# Patient Record
Sex: MALE | Race: WHITE
[De-identification: names, ages, dates, MRNs, and addresses within clinical notes are randomized per-mention and may not be internally consistent; named-entity substitution may affect disease eponyms.]

---

## 2017-07-20 ENCOUNTER — HOSPITAL ENCOUNTER (OUTPATIENT)
Dept: HOSPITAL 23 - CED | Age: 32
Setting detail: OBSERVATION
LOS: 1 days | Discharge: HOME | DRG: 395 | End: 2017-07-21
Attending: INTERNAL MEDICINE | Admitting: INTERNAL MEDICINE
Payer: COMMERCIAL

## 2017-07-20 VITALS — HEIGHT: 72 IN | WEIGHT: 240 LBS | BODY MASS INDEX: 32.51 KG/M2

## 2017-07-20 DIAGNOSIS — Z79.899: ICD-10-CM

## 2017-07-20 DIAGNOSIS — K22.2: ICD-10-CM

## 2017-07-20 DIAGNOSIS — K20.9: ICD-10-CM

## 2017-07-20 DIAGNOSIS — T18.128A: Primary | ICD-10-CM

## 2017-07-20 LAB
BASOPHIL#: 0 X10E3
BASOPHIL%: 0.3 %
BLOOD UREA NITROGEN: 10 MG/DL
BUN/CREATININE RATIO: 7.69
CALCIUM SERUM: 9.2 MG/DL
CK MB SERPL-RTO: 14.7 %
CK MB SERPL-RTO: 31.7 G/DL
CREATININE SERUM: 1.3 MG/DL
DIFF IND: NO
EOSINOPHIL#: 0.3 X10E3
EOSINOPHIL%: 2.3 %
GLOM FILT RATE ESTIMATED: 71.7 ML/MIN
GLUCOSE FASTING: 82 MG/DL
HEMATOCRIT: 52.2 %
HEMOGLOBIN: 16.6 GM/DL
KETONES UR QL: 107 MMOL/L
KETONES UR QL: 26 MMOL/L
LYMPHOCYTE#: 2.2 X10E3
LYMPHOCYTE%: 15.8 %
MEAN CELL VOLUME: 87.9 FL
MEAN CORPUSCULAR HEMOGLOBIN: 27.9 PG
MEAN PLATELET VOLUME: 9.7 FL
MONOCYTE#: 1.3 X10E3
MONOCYTE%: 9.2 %
NEUTROPHIL#: 10.2 X10E3
NEUTROPHIL%: 72.4 %
PLATELET COUNT: 317 X10E3
POTASSIUM: 4 MMOL/L
RED BLOOD COUNT: 5.94 X10E
SODIUM: 141 MMOL/L
WHITE BLOOD COUNT: 14.1 X10E3

## 2017-07-20 PROCEDURE — C9113 INJ PANTOPRAZOLE SODIUM, VIA: HCPCS

## 2017-07-20 PROCEDURE — G0378 HOSPITAL OBSERVATION PER HR: HCPCS

## 2017-09-12 ENCOUNTER — HOSPITAL ENCOUNTER (OUTPATIENT)
Dept: OTHER | Facility: HOSPITAL | Age: 32
Setting detail: SPECIMEN
Discharge: HOME | End: 2017-09-12
Attending: INTERNAL MEDICINE | Admitting: INTERNAL MEDICINE

## 2020-07-22 ENCOUNTER — OFFICE VISIT (OUTPATIENT)
Dept: ORTHOPEDIC SURGERY | Facility: CLINIC | Age: 35
End: 2020-07-22

## 2020-07-22 VITALS — BODY MASS INDEX: 35.21 KG/M2 | TEMPERATURE: 98.1 F | HEIGHT: 72 IN | WEIGHT: 260 LBS

## 2020-07-22 DIAGNOSIS — M25.522 LEFT ELBOW PAIN: ICD-10-CM

## 2020-07-22 DIAGNOSIS — M79.602 LEFT ARM PAIN: Primary | ICD-10-CM

## 2020-07-22 PROCEDURE — 73070 X-RAY EXAM OF ELBOW: CPT | Performed by: ORTHOPAEDIC SURGERY

## 2020-07-22 PROCEDURE — 99203 OFFICE O/P NEW LOW 30 MIN: CPT | Performed by: ORTHOPAEDIC SURGERY

## 2020-07-22 RX ORDER — IBUPROFEN 800 MG/1
800 TABLET ORAL EVERY 6 HOURS PRN
COMMUNITY

## 2020-07-22 NOTE — PROGRESS NOTES
Patient: Cachorro Santos    YOB: 1985    Medical Record Number: [id number]    Chief Complaints: Left elbow injury    History of Present Illness:     35 y.o. male patient who presents for his left elbow.  He had an injury at work on Monday.  He was moving a tire out of a rack when he felt a pain in the back of his elbow.  At the time, the pain was severe, 8 out of 10 by his estimate.  There was some concern about a possible biceps tendon injury and he was referred here.  Since Monday, his pain has gotten better.  Current pain is mild to moderate, intermittent and both aching and burning.  He rates his pain as 4-5 out of 10 at this point.  Most of his pain he localizes to the soft spot of the elbow, posterior laterally.  Denies having noticed any swelling, ecchymosis or deformity.  He has been wearing a sling and it does seem to help.  He is right-hand dominant.  He has been on light duty at work.    Allergies: No Known Allergies    Home Medications:      Current Outpatient Medications:   •  ibuprofen (ADVIL,MOTRIN) 800 MG tablet, Take 800 mg by mouth Every 6 (Six) Hours As Needed for Mild Pain ., Disp: , Rfl:     History reviewed. No pertinent past medical history.    History reviewed. No pertinent surgical history.    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  He is right-hand dominant.  Does not have any history of alcohol abuse, illicit drug use or tobacco use.    No pertinent family history.    Review of Systems:      Constitutional: Denies fever, shaking or chills   Eyes: Denies change in visual acuity   HEENT: Denies nasal congestion or sore throat   Respiratory: Denies cough or shortness of breath   Cardiovascular:  "Denies chest pain or edema  Endocrine: Denies tremors, palpitations, intolerance of heat or cold, polyuria, polydipsia.  GI: Denies abdominal pain, nausea, vomiting, bloody stools or diarrhea  : Denies frequency, urgency, incontinence, retention, or nocturia.  Musculoskeletal: Denies numbness, tingling or loss of motor function except as above  Integument: Denies rash, lesion or ulceration   Neurologic: Denies headache or focal weakness, deficits  Heme: Denies spontaneous or excessive bleeding, epistaxis, hematuria, melena, fatigue, enlarged or tender lymph nodes.      All other pertinent positives and negatives as noted above in HPI.    Physical Exam: 35 y.o. male    Vitals:    07/22/20 1610   Temp: 98.1 °F (36.7 °C)   TempSrc: Temporal   Weight: 118 kg (260 lb)   Height: 182.9 cm (72\")     General:  Patient is awake and alert.  Appears in no acute distress or discomfort.    Psych:  Affect and demeanor are appropriate.    Eyes:  Conjunctiva and sclera appear grossly normal.  Eyes track well and EOM seem to be intact.    Ears:  No gross abnormalities.  Hearing adequate for the exam.    Cardiovascular:  Regular rate and rhythm.    Lungs:  Good chest expansion.  Breathing unlabored.    Lymph:  No palpable masses or adenopathy in the affected extremity    Extremities:  Left elbow:  Skin is benign.  No gross abnormalities on inspection.  No palpable masses or adenopathy.  No effusion.  Mild tenderness over the soft spot of the elbow.  He does not really seem to have much tenderness over the distal biceps and his biceps is intact with a hook test.  Triceps is also palpably intact.  No tenderness over the medial or lateral epicondyle.  His motion is fairly good.  He can get his arm straight, within 5 degrees or so, but it does cause him posterior lateral discomfort.  Hyperflexion is also moderately uncomfortable but is symmetric to the contralateral side.  No instability.  Good strength with elbow flexion and extension " as well as resisted forearm pronation and supination all of which are just mildly uncomfortable.  Sensation is intact.  Brisk capillary refill in the fingers with good skin turgor.         Radiology:   AP and lateral views of the left elbow are ordered and reviewed.  No comparison films are available.  No acute abnormalities, malalignment, significant degenerative changes or other concerning findings.    Assessment/Plan: Left elbow strain    His biceps is palpably intact and his exam suggests that this is more of a capsular strain than anything else.  I think most prudent course of action at this point is just to have him rest, ice and continue the anti-inflammatories.  I am going to keep him on light duty at work.  I will check him again next week to make sure he is continuing to improve.  If he is still having pain at that point, we may consider an MRI.  I do not consider that test is necessary at this point.    Jeffrey Uribe MD    07/22/2020    CC to Krunal Miller APRN

## 2020-07-29 ENCOUNTER — OFFICE VISIT (OUTPATIENT)
Dept: ORTHOPEDIC SURGERY | Facility: CLINIC | Age: 35
End: 2020-07-29

## 2020-07-29 VITALS — TEMPERATURE: 97 F | WEIGHT: 260 LBS | BODY MASS INDEX: 35.21 KG/M2 | HEIGHT: 72 IN

## 2020-07-29 DIAGNOSIS — M25.522 LEFT ELBOW PAIN: Primary | ICD-10-CM

## 2020-07-29 PROCEDURE — 99213 OFFICE O/P EST LOW 20 MIN: CPT | Performed by: ORTHOPAEDIC SURGERY

## 2020-07-29 NOTE — PROGRESS NOTES
Patient:Cachorro Santos    YOB: 1985    Medical Record Number:[id number]    Chief Complaints: Follow-up regarding left elbow injury    History of Present Illness:     35 y.o. male patient who presents for follow-up of his left elbow.  He says he feels like it is not really a whole lot better.  He still having pain, particularly when straightening the elbow.  He does not feel like he could go back and do his job.    Allergies:No Known Allergies    Home Medications:    Current Outpatient Medications:   •  ibuprofen (ADVIL,MOTRIN) 800 MG tablet, Take 800 mg by mouth Every 6 (Six) Hours As Needed for Mild Pain ., Disp: , Rfl:     History reviewed. No pertinent past medical history.    History reviewed. No pertinent surgical history.    Social History     Occupational History   • Not on file   Tobacco Use   • Smoking status: Not on file   Substance and Sexual Activity   • Alcohol use: Defer   • Drug use: Defer   • Sexual activity: Defer      Social History     Social History Narrative   • Not on file       History reviewed. No pertinent family history.    Review of Systems:      Constitutional: Denies fever, shaking or chills   Eyes: Denies change in visual acuity   HEENT: Denies nasal congestion or sore throat   Respiratory: Denies cough or shortness of breath   Cardiovascular: Denies chest pain or edema  Endocrine: Denies tremors, palpitations, intolerance of heat or cold, polyuria, polydipsia.  GI: Denies abdominal pain, nausea, vomiting, bloody stools or diarrhea  : Denies frequency, urgency, incontinence, retention, or nocturia.  Musculoskeletal: Denies numbness, tingling or loss of motor function except as above  Integument: Denies rash, lesion or ulceration   Neurologic: Denies headache or focal weakness, deficits  Heme: Denies spontaneous or excessive bleeding, epistaxis, hematuria, melena, fatigue, enlarged or tender lymph nodes.      All other pertinent positives and negatives as noted above in  "HPI.    Physical Exam:35 y.o. male  Vitals:    07/29/20 1117   Temp: 97 °F (36.1 °C)   Weight: 118 kg (260 lb)   Height: 182.9 cm (72.01\")       General:  Patient is awake and alert.  Appears in no acute distress or discomfort.    Psych:  Affect and demeanor are appropriate.    Extremities: Left elbow is examined.  Skin is benign.  No obvious deformity, swelling or other appreciable abnormalities on inspection.  His biceps is palpably intact with a hook test.  He does not really have much tenderness over the biceps.  He is tender over the soft spot of the elbow, the lateral epicondyle and lateral ligamentous complex, and the triceps insertion.  There is no palpable defect in the triceps.  He has good elbow motion.  Extremes of supination are uncomfortable.  Extending against resistance is uncomfortable as well.  Normal motor and sensory function in his lower arm and hand.  Palpable radial pulse.  Brisk capillary refill.    Imaging: None taken    Assessment/Plan: Left elbow capsular strain and/or low-grade collateral ligament sprain    He does not feel like he is a whole lot better.  I actually think his exam is fairly benign but I have agreed to work this up further with an MRI.  I have agreed to keep him on work restrictions for now.  I told him I will call him with the results of his MRI.  We will come up with a plan pending review of that study.    Jeffrey Uribe MD    07/29/2020    "

## 2020-08-10 ENCOUNTER — HOSPITAL ENCOUNTER (OUTPATIENT)
Dept: PHYSICAL THERAPY | Facility: HOSPITAL | Age: 35
Setting detail: THERAPIES SERIES
Discharge: HOME | End: 2020-08-10

## 2020-08-10 DIAGNOSIS — M99.07 SEGMENTAL AND SOMATIC DYSFUNCTION OF UPPER EXTREMITY: ICD-10-CM

## 2020-08-10 DIAGNOSIS — S46.912D STRAIN OF LEFT ELBOW, SUBSEQUENT ENCOUNTER: ICD-10-CM

## 2020-08-10 DIAGNOSIS — M25.522 LEFT ELBOW PAIN: Primary | ICD-10-CM

## 2020-08-10 PROCEDURE — 97140 MANUAL THERAPY 1/> REGIONS: CPT

## 2020-08-10 PROCEDURE — 97110 THERAPEUTIC EXERCISES: CPT

## 2020-08-10 PROCEDURE — 97162 PT EVAL MOD COMPLEX 30 MIN: CPT

## 2020-08-10 NOTE — THERAPY EVALUATION
Outpatient Physical Therapy Ortho Initial Evaluation  [facility]     Patient Name: Cachorro Santos  : 1985  MRN: [id number]  Today's Date: 8/10/2020      Visit Date: 08/10/2020    There is no problem list on file for this patient.       No past medical history on file.     No past surgical history on file.    Visit Dx:     ICD-10-CM ICD-9-CM   1. Left elbow pain M25.522 719.42   2. Segmental and somatic dysfunction of upper extremity M99.07 739.7   3. Strain of left elbow, subsequent encounter S46.912D V58.89     841.9         Patient History     Row Name 08/10/20 1500             History    Chief Complaint  Pain  -JH      Type of Pain  Elbow pain  -      Date Current Problem(s) Began  20  -      Brief Description of Current Complaint  Pt reports he was reaching away from his body to grab a tire at work resulting in acute pain in the L elbow making it difficult to straighten the elbow or lift anything. States he saw Jojo who referred him to PT and scheduled MRI. He reports the pain is localized to the L elbow and describes it as popping, grinding, achy, and sharp. He is right handed so is able to still function fairly well however is unable to perform work tasks of lifting and moving tires without increase in pain. As a result he is on light duty at work. Worst motions are full elbow extension, and pronation.   -         Pain     Pain Location  Elbow  -      Pain at Present  0  -JH      Pain at Best  0  -JH      Pain at Worst  4  -JH      What Performance Factors Make the Current Problem(s) WORSE?  lifting w/ L arm, rotation of forearm, extending elbow  -      What Performance Factors Make the Current Problem(s) BETTER?  rest, ice, anti-inflammatories  -      Difficulties at work?  cannot lift or move tires (40-50#)  -         Fall Risk Assessment    Any falls in the past year:  No  -         Daily Activities    Primary Language  English  -      How does patient learn best?   Demonstration;Reading;Listening;Pictures/Video  -      Teaching needs identified  Home Exercise Program;Management of Condition  -      Patient is concerned about/has problems with  Performing job responsibilities/community activities (work, school,;Repetitive movements of the hand, arm, shoulder;Grasping objects lifting  -      Pt Participated in POC and Goals  Yes  -         Safety    Are you being hurt, hit, or frightened by anyone at home or in your life?  No  -JH      Are you being neglected by a caregiver  No  -JH        User Key  (r) = Recorded By, (t) = Taken By, (c) = Cosigned By    Initials Name Provider Type     Deb Johnson, PT Physical Therapist          PT Ortho     Row Name 08/10/20 1500       Posture/Observations    Posture/Observations Comments  pt holds L elbow in slight flexed position  -       Special Tests/Palpation    Special Tests/Palpation  Elbow/Forearm  -       Elbow/Forearm Special Tests    Valgus Stress at 30 Degrees (UCL Lesion)  Left:;Positive recreation of medial pain  -    Medial Epicondyle Test (Golfer's Elbow)  Left:;Positive medial pain w/ resisted wrist flexion  -    Lateral Epicondyle Test (Tennis Elbow)  Left:;Positive lateral pain w/ resisted wrist extension  -       Elbow/Forearm Palpation    Elbow/Forearm Palpation?  Yes  -    Medial Epicondyle  Left:;Tender  -    Extensor Tendon  Left:;Guarded/taut  -    Flexor Tendon  Left:;Tender  -    Triceps Insertion  Left:;Tender  -    Medial Collateral  Left:;Tender  -       General ROM    LT Upper Ext  Lt Elbow Extension/Flexion;Lt Elbow Pronation;Lt Elbow Supination;Lt Wrist Flexion;Lt Wrist Extension  -       Left Upper Ext    Lt Elbow Extension/Flexion AROM   pain begins at 53 from full extension  -    Lt Elbow Extension/Flexion PROM  WNL  -    Lt Elbow Supination AROM  WNL  -    Lt Elbow Pronation AROM  Pain at end range  -       MMT (Manual Muscle Testing)    Rt Upper Ext  Rt  Elbow Flexion;Rt Elbow Extension;Rt Forearm Supination;Rt Forearm Pronation;Rt Shoulder WNL  -JH    Lt Upper Ext  Lt Shoulder Internal Rotation;Lt Shoulder External Rotation;Lt Elbow Extension;Lt Elbow Flexion;Lt Forearm Supination;Lt Forearm Pronation;Lt Shoulder WNL  -JH       MMT Right Upper Ext    Rt Elbow Flexion MMT, Gross Movement:  (5/5) normal  -    Rt Elbow Extension MMT, Gross Movement:  (5/5) normal  -    Rt Forearm Supination MMT, Gross Movement  (5/5) normal  -    Rt Forearm Pronation MMT, Gross Movement  (5/5) normal  -    Rt Wrist Flexion MMT, Gross Movement  (5/5) normal  -       MMT Left Upper Ext    Lt Elbow Flexion MMT, Gross Movement  (4/5) good  -    Lt Elbow Extension MMT, Gross Movement  (4-/5) good minus pain at triceps attachment  -    Lt Forearm Supination MMT, Gross Movement  (4/5) good pain in wrist extensors  -    Lt Forearm Pronation MMT, Gross Movement  (4-/5) good minus pain medial epicondyle  -    Lt Wrist Flexion MMT, Gross Movement  (4-/5) good minus pain medial epicondyle  -    Lt Wrist Extension MMT, Gross Movement  (4-/5) good minus pain lateral epicondyle  -    Lt Upper Extremity Comments   pain limited strength  -        Strength Right    Right Rung  2  -    Right  Test 1  140  -JH    Right  Test 2  140  -JH    Right  Test 3  140  -JH     Strength Average Right  140  -JH        Strength Left    # Reps  3  -JH    Left Rung  2  -JH    Left  Test 1  55  -JH    Left  Test 2  50  -JH    Left  Test 3  55  -JH     Strength Average Left  53.33  -JH       Sensation    Sensation WNL?  WNL  -       Hand  Strength     Strength Affected Side  Left  -      User Key  (r) = Recorded By, (t) = Taken By, (c) = Cosigned By    Initials Name Provider Type    Deb Trivedi PT Physical Therapist                      Therapy Education  Education Details: role of OP PT, nature of condition, anatomy of elbow, HEP w/  expected response to exercise  Given: HEP, Symptoms/condition management, Pain management, Posture/body mechanics  Program: New  How Provided: Verbal, Demonstration, Written  Provided to: Patient  Level of Understanding: Teach back education performed, Verbalized, Demonstrated     PT OP Goals     Row Name 08/10/20 1500          PT Short Term Goals    STG Date to Achieve  09/09/20  -     STG 1  Pt will be independent and verbalized good understanding of initial HEP to improve ability to manage pain, as well as improve strength and ROM.  -     STG 1 Progress  [facility]     STG 2  Pt will demonstrate L elbow flx/ext ROM WNL  -     STG 2 Progress  [facility]     STG 3  Pt will demonstrate negative special tests at the elbow  -     STG 3 Progress  [facility]        Long Term Goals    LTG Date to Achieve  10/09/20  -     LTG 1  Pt will be independent and verbalized good understanding of advanced HEP to improve ability to manage pain, as well as improve strength and ROM.  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  -[facility] 4 Progress  [facility] 5  Pt will demo improvement in quickDASH score to 8% disability to demonstrate improvement in overall QOL  -[facility] 5 [facility]        Time Calculation    PT Goal Re-Cert Due Date  11/08/20  [facility]       User Key  (r) = Recorded By, (t) = Taken By, (c) = Cosigned By    Initials Name Provider Type     Deb Johnson, PT Physical Therapist          PT Assessment/Plan     Row Name 08/10/20 1701          PT Assessment    Functional Limitations  Limitations in functional capacity and performance;Performance in sport activities;Performance in work activities;Performance in  leisure activities  -     Assessment Comments  Cachorro Santos is a 35 y.o. male referred to physical therapy for left elbow pain due to work injury. He presents with an evolving clinical presentation, along with no remarkable comorbidities or personal factors that may impact his progress in the plan of care. Pt presents today with reduced elbow extension ROM, medial pain with end range pronation, TTP through wrist extensors, wrist flexor attachment, and triceps attachment, and positive special test findings for lateral epicondylitis, medial epicondylitis, and UCL sprain . his signs and symptoms are consistent with a physical therapy diagnosis of left elbow strain/sprain of multiple tissues. The previous impairments limit his ability to fully extend the arm, lift or carry objects, participate in recreational activities, or perform necessary work tasks of repetitively lifting and moving tires. Pt will benefit from skilled PT to address the previous impairments and return to [facility]  -     Please refer to paper survey for additional self-reported information  Yes  -     Rehab Potential  [facility]  -     Patient/caregiver participated in establishment of treatment plan and goals  Yes  -     Patient would benefit from skilled therapy intervention  Yes  -        PT Plan    PT Frequency  1x/week;2x/week  -     Predicted Duration of Therapy Intervention (Therapy Eval)  8-10 visits  -     Planned CPT's?  PT EVAL MOD COMPLELITY: 85208;PT RE-EVAL: 59270;PT THER ACT EA 15 MIN: 35308;PT NEUROMUSC RE-EDUCATION EA 15 MIN: 27433;PT SELF CARE/HOME MGMT/TRAIN EA 15: 73933;PT ELECTRICAL STIM UNATTEND: ;PT ULTRASOUND EA 15 MIN: 65952;PT MANUAL THERAPY EA 15 MIN: 71410;PT THER PROC EA 15 MIN: 89198  -     Physical Therapy Interventions (Optional Details)  ROM (Range of Motion);taping;stretching;strengthening;postural re-education;patient/family education;manual therapy techniques;modalities;neuromuscular  re-education;joint mobilization;home exercise program;dry needling  -     PT Plan Comments  assess response to eval and compliance w/ HEP, if good tolerance to isometrics-consider eccentric wrist flx/ext/pro/sup, consider gentle wrist extensor and flexor stretches, tricep press  -       User Key  (r) = Recorded By, (t) = Taken By, (c) = Cosigned By    Initials Name Provider Type     Deb Johnson PT Physical Therapist            OP Exercises     Row Name 08/10/20 1600 08/10/20 1500          Total Minutes    00026 - PT Therapeutic Exercise Minutes  15  -JH  --     83484 - PT Manual Therapy Minutes  --  10  -JH        Exercise 1    Exercise Name 1  elbow flx/ext  -JH  --     Reps 1  10  -JH  --     Additional Comments  pain free range  -JH  --        Exercise 2    Exercise Name 2  forearm supination/pronation  -JH  --     Reps 2  10  -JH  --     Additional Comments  pain free range  -JH  --        Exercise 3    Exercise Name 3  wrist circles  -JH  --     Reps 3  10  -JH  --     Additional Comments  pain free range  -JH  --        Exercise 4    Exercise Name 4  wrist flx/ext isometrics  -JH  --     Reps 4  3  -JH  --     Time 4  10s each  -JH  --        Exercise 5    Exercise Name 5  ball squeeze  -  --     Reps 5  5  -JH  --     Time 5  5s  -JH  --     Additional Comments  below pain threshold  -  --       User Key  (r) = Recorded By, (t) = Taken By, (c) = Cosigned By    Initials Name Provider Type     Deb Johnson PT Physical Therapist           Manual Rx (last 36 hours)      Manual Treatments     Row Name 08/10/20 1500             Total Minutes    04833 - PT Manual Therapy Minutes  10  -JH         Manual Rx 1    Manual Rx 1 Location  STM to forearm extensors and flexors, gentle humeroulnar distraction, gentle AP glides radioulnar jt to promote improve extension  -        User Key  (r) = Recorded By, (t) = Taken By, (c) = Cosigned By    Initials Name Provider Type    Deb Trivedi PT  Physical Therapist                      Outcome Measure Options: Quick DASH  Quick DASH  Open a tight or new jar.: No Difficulty  Do heavy household chores (e.g., wash walls, wash floors): Mild Difficulty  Carry a shopping bag or briefcase: Mild Difficulty  Wash your back: No Difficulty  Use a knife to cut food: No Difficulty  Recreational activities in which you take some force or impact through your arm, should or hand (e.g. golf, hammering, tennis, etc.): Moderate Difficulty  During the past week, to what extent has your arm, shoulder, or hand problem interfered with your normal social activites with family, friends, neighbors or groups?: Slightly  During the past week, were you limited in your work or other regular daily activities as a result of your arm, shoulder or hand problem?: Moderately Limited  Arm, Shoulder, or hand pain: None  Tingling (pins and needles) in your arm, shoulder, or hand: None  During the past week, how much difficulty have you had sleeping because of the pain in your arm, shoulder or hand?: Mild Difficulty  Number of Questions Answered: 11  Quick DASH Score: 18.18         Time Calculation:     Start Time: 1600  Stop Time: 1645  Time Calculation (min): 45 min     Therapy Charges for Today     Code Description Service Date Service Provider Modifiers Qty    36979361779 HC PT THER PROC EA 15 MIN 8/10/2020 Deb Johnson, PT GP 1    61951892536 HC PT MANUAL THERAPY EA 15 MIN 8/10/2020 Deb Johnson, PT GP 1    54619052738 HC PT EVAL MOD COMPLEXITY 1 8/10/2020 Deb Johnson, PT GP 1          PT G-Codes  Outcome Measure Options: Quick DASH  Quick DASH Score: 18.18         Deb Johnson PT  8/10/2020

## 2020-08-11 ENCOUNTER — APPOINTMENT (OUTPATIENT)
Dept: PHYSICAL THERAPY | Facility: HOSPITAL | Age: 35
End: 2020-08-11

## 2020-09-03 ENCOUNTER — APPOINTMENT (OUTPATIENT)
Dept: PHYSICAL THERAPY | Facility: HOSPITAL | Age: 35
End: 2020-09-03

## 2020-09-03 ENCOUNTER — TELEPHONE (OUTPATIENT)
Dept: PHYSICAL THERAPY | Facility: HOSPITAL | Age: 35
End: 2020-09-03

## 2020-09-03 NOTE — TELEPHONE ENCOUNTER
Left VM regarding 3 missed appointments, taking pt off schedule for future appointments per cancellation policy.

## 2020-09-08 ENCOUNTER — APPOINTMENT (OUTPATIENT)
Dept: PHYSICAL THERAPY | Facility: HOSPITAL | Age: 35
End: 2020-09-08

## 2020-09-10 ENCOUNTER — APPOINTMENT (OUTPATIENT)
Dept: PHYSICAL THERAPY | Facility: HOSPITAL | Age: 35
End: 2020-09-10

## 2020-10-19 ENCOUNTER — TELEPHONE (OUTPATIENT)
Dept: ORTHOPEDIC SURGERY | Facility: CLINIC | Age: 35
End: 2020-10-19

## 2020-10-19 NOTE — TELEPHONE ENCOUNTER
He missed his last few appointments.  I have not seen him or evaluated his elbow in some time.  Last visit, he said that his elbow was not much better and I ordered an MRI.  I do not see that he ever got that MRI.  Is the elbow now better?  If so, he can go back to work.  If not, I need to know what the MRI showed.

## 2020-10-19 NOTE — TELEPHONE ENCOUNTER
PATIENT: MILADY ROCK  CONTACT: [phone]    PATIENT COMPLETED PT FOR A WORK COMP CLAIM. PATIENT WANTS TO KNOW IF HE NEEDS TO FOLLOW UP WITH  OR IF HE IS GOOD TO GO TO WORK NOW.    PLEASE CONTACT PATIENT BACK HE IS UN AWARE OF WHAT TO DO MOVING FORWARD.

## 2020-10-26 NOTE — TELEPHONE ENCOUNTER
Pt states he is much better, almost at 100%. Pt states he will need letter from [employer] stating he is released to go back to work.

## 2020-10-26 NOTE — TELEPHONE ENCOUNTER
PT IS CALLING BACK CONCERNING ORIGINAL MESSAGE FOR WORK RELEASE.  PLEASE RETURN CALL AS SOON AS POSSIBLE.    MILADY ROCK MAY BE REACHED AT:  [phone]